# Patient Record
Sex: MALE | Race: WHITE | NOT HISPANIC OR LATINO | Employment: OTHER | URBAN - METROPOLITAN AREA
[De-identification: names, ages, dates, MRNs, and addresses within clinical notes are randomized per-mention and may not be internally consistent; named-entity substitution may affect disease eponyms.]

---

## 2017-04-10 ENCOUNTER — TELEPHONE (OUTPATIENT)
Dept: GASTROENTEROLOGY | Facility: CLINIC | Age: 52
End: 2017-04-10

## 2017-04-11 ENCOUNTER — TELEPHONE (OUTPATIENT)
Dept: GASTROENTEROLOGY | Facility: CLINIC | Age: 52
End: 2017-04-11

## 2017-04-11 DIAGNOSIS — G12.21 ALS (AMYOTROPHIC LATERAL SCLEROSIS): Primary | ICD-10-CM

## 2017-04-11 NOTE — TELEPHONE ENCOUNTER
Scheduled PEG tube removal for April 13. Lab will call wife with time. Patient will hold Eliquis until after procedure. Patient's wife verbalized understanding.

## 2017-04-11 NOTE — TELEPHONE ENCOUNTER
----- Message from Teodora Anderson sent at 4/10/2017  9:55 AM CDT -----  Contact: Anahy(wife)/[phone]  Patient's wife would like an appointment scheduled for the patient' feeding tube to be replaced.  Please advise

## 2017-04-11 NOTE — TELEPHONE ENCOUNTER
----- Message from Ella Horton sent at 4/11/2017 12:00 PM CDT -----  Contact: [phone]/self  Patient is returning your call. Please advise

## 2017-04-11 NOTE — TELEPHONE ENCOUNTER
Please inquire as to WHY the tube needs replacement.    Would need to be set up as an EGD slot to allow reinsertion of the PEG replacement. NO scope, only sedation.    CNB    ----- Message from Kathia Chavez LPN sent at 4/10/2017  1:33 PM CDT -----  Contact: Anahy(wife)/[phone]   Is this something you would like to do during a clinic appointment?  ----- Message -----     From: Teodora Anderson     Sent: 4/10/2017   9:55 AM       To: Tavon Hayes Staff ([place])    Patient's wife would like an appointment scheduled for the patient' feeding tube to be replaced.  Please advise

## 2017-04-17 ENCOUNTER — HOSPITAL ENCOUNTER (OUTPATIENT)
Facility: HOSPITAL | Age: 52
Discharge: HOME | End: 2017-04-17
Attending: INTERNAL MEDICINE | Admitting: INTERNAL MEDICINE
Payer: MEDICARE

## 2017-04-17 ENCOUNTER — SURGERY (OUTPATIENT)
Age: 52
End: 2017-04-17

## 2017-04-17 ENCOUNTER — ANESTHESIA EVENT (OUTPATIENT)
Dept: ENDOSCOPY | Facility: HOSPITAL | Age: 52
End: 2017-04-17
Payer: MEDICARE

## 2017-04-17 ENCOUNTER — ANESTHESIA (OUTPATIENT)
Dept: ENDOSCOPY | Facility: HOSPITAL | Age: 52
End: 2017-04-17
Payer: MEDICARE

## 2017-04-17 DIAGNOSIS — G12.21 ALS (AMYOTROPHIC LATERAL SCLEROSIS): Primary | ICD-10-CM

## 2017-04-17 DIAGNOSIS — Z43.1 PEG (PERCUTANEOUS ENDOSCOPIC GASTROSTOMY) ADJUSTMENT/REPLACEMENT/REMOVAL: ICD-10-CM

## 2017-04-17 LAB — POCT GLUCOSE: 103 MG/DL (ref 70–110)

## 2017-04-17 PROCEDURE — 25000003 PHARM REV CODE 250: Performed by: INTERNAL MEDICINE

## 2017-04-17 PROCEDURE — 63600175 PHARM REV CODE 636 W HCPCS: Performed by: NURSE ANESTHETIST, CERTIFIED REGISTERED

## 2017-04-17 PROCEDURE — 37000009 HC ANESTHESIA EA ADD 15 MINS: Performed by: INTERNAL MEDICINE

## 2017-04-17 PROCEDURE — 37000008 HC ANESTHESIA 1ST 15 MINUTES: Performed by: INTERNAL MEDICINE

## 2017-04-17 PROCEDURE — 43760 *HC REPLACEMENT GASTROS TUBE: CPT | Performed by: INTERNAL MEDICINE

## 2017-04-17 DEVICE — LOW PROFILE REPLACEMENT GASTROSTOMY TUBE
Type: IMPLANTABLE DEVICE | Site: ABDOMEN | Status: FUNCTIONAL
Brand: ENDOVIVE LOW PROFILE REPLACEMENT BUTTON

## 2017-04-17 RX ORDER — PROPOFOL 10 MG/ML
VIAL (ML) INTRAVENOUS
Status: DISCONTINUED | OUTPATIENT
Start: 2017-04-17 | End: 2017-04-17

## 2017-04-17 RX ORDER — SODIUM CHLORIDE 9 MG/ML
INJECTION, SOLUTION INTRAVENOUS CONTINUOUS
Status: DISCONTINUED | OUTPATIENT
Start: 2017-04-17 | End: 2017-04-17 | Stop reason: HOSPADM

## 2017-04-17 RX ORDER — PROPOFOL 10 MG/ML
VIAL (ML) INTRAVENOUS CONTINUOUS PRN
Status: DISCONTINUED | OUTPATIENT
Start: 2017-04-17 | End: 2017-04-17

## 2017-04-17 RX ADMIN — PROPOFOL 50 MG: 10 INJECTION, EMULSION INTRAVENOUS at 01:04

## 2017-04-17 RX ADMIN — SODIUM CHLORIDE: 0.9 INJECTION, SOLUTION INTRAVENOUS at 12:04

## 2017-04-17 RX ADMIN — PROPOFOL 150 MCG/KG/MIN: 10 INJECTION, EMULSION INTRAVENOUS at 01:04

## 2017-04-17 NOTE — ANESTHESIA PREPROCEDURE EVALUATION
04/17/2017  Bro Stafford is a 52 y.o., male with ALS, here for PEG tube adjustment.    Pt has been vent dependent for 6 years. No recent fever/cough/cold, no recent vent setting changes. Pt had nephrostomy tube placed under propfol sedation in February without complication.     Anesthesia Evaluation      I have reviewed the Medications.     Review of Systems  Anesthesia Hx:  No problems with previous Anesthesia Denies Hx of Anesthetic complications History of prior surgery of interest to airway management or planning: Previous anesthesia: General Denies Family Hx of Anesthesia complications.   Denies Personal Hx of Anesthesia complications.   Social:  Non-Smoker, No Alcohol Use    Hematology/Oncology:  Hematology Normal   Oncology Normal     EENT/Dental:EENT/Dental Normal   Cardiovascular:  Cardiovascular Normal Exercise tolerance: good     Pulmonary:  Pulmonary Normal    Renal/:  Renal/ Normal     Hepatic/GI:  Hepatic/GI Normal    Musculoskeletal:  Musculoskeletal Normal  Musculoskeletal General/Symptoms:  Muscle Disorders:    Neurological:  Dementia  Neuromuscular Disease, Amyotrophic Lateral Sclerosis Pt is vent dependant.  Endocrine:   Diabetes, well controlled, type 2    Dermatological:  Skin Normal    Psych:   anxiety          Physical Exam  General:  Well nourished    Airway/Jaw/Neck:  Airway Findings: Mouth Opening: Normal Tongue: Normal  Pre-Existing Airway Tube(s): Tracheostomy tube  Size: 8.0  General Airway Assessment: Adult  Mallampati: II      Dental:  Dental Findings: In tact   Chest/Lungs:  Chest/Lungs Findings: Clear to auscultation, Normal Respiratory Rate     Heart/Vascular:  Heart Findings: Rate: Normal  Rhythm: Regular Rhythm        Mental Status:  Mental Status Findings:  Cooperative, Alert and Oriented         Anesthesia Plan  Type of Anesthesia, risks & benefits  discussed:  Anesthesia Type:  MAC  Patient's Preference: MAC  Intra-op Monitoring Plan:   Intra-op Monitoring Plan Comments:   Post Op Pain Control Plan:   Post Op Pain Control Plan Comments:   Induction:   IV  Beta Blocker:         Informed Consent: Patient representative understands risks and agrees with Anesthesia plan.  Questions answered. Anesthesia consent signed with patient representative.  ASA Score: 4     Day of Surgery Review of History & Physical:            Ready For Surgery From Anesthesia Perspective.

## 2017-04-17 NOTE — TRANSFER OF CARE
"Anesthesia Transfer of Care Note    Patient: Bro Stafford    Procedure(s) Performed: Procedure(s) (LRB):  ESOPHAGOGASTRODUODENOSCOPY (EGD) (N/A)    Patient location: GI    Anesthesia Type: general    Transport from OR: Upon arrival to PACU/ICU, patient attached to ventilator and auscultated to confirm bilateral breath sounds and adequate TV    Post pain: adequate analgesia    Post assessment: no apparent anesthetic complications and tolerated procedure well    Post vital signs: stable    Level of consciousness: awake and alert    Nausea/Vomiting: no nausea/vomiting    Complications: none    Comments: Patient on home vent      Last vitals:   Visit Vitals    /63    Pulse 70    Temp 36.5 °C (97.7 °F)    Resp 18    Ht 5' 7" (1.702 m)    Wt 77.1 kg (170 lb)    SpO2 100%    BMI 26.63 kg/m2     "

## 2017-04-17 NOTE — DISCHARGE INSTRUCTIONS
Gastrostomy Feeding Tube Care: Flushing  With gastrostomy tube feeding, you need to keep the tube from getting clogged by flushing it with warm water after each feeding and before and after giving any medicines.             For continuous feeding  Flush the feeding tube with warm water and a clean syringe before the first daily feeding, after the last daily feeding, and other times as instructed. Follow the steps below:  1. Fill a clean bowl with warm water.  2. Put the tip of the syringe in the water.  3. Draw up 50 cc of water.  4. Turn off the pump.  5. Close the clamp on the feeding bag tubing.  6. Remove the tubing from the port.  7. Put the tip of the syringe in the feeding port.  8. Push the plunger down.  9. Let the water run through the feeding tube.  10. Start the feeding or close the cap on the feeding port.  11. Tape the tube to the skin with medical tape.         For bolus feeding  You may be told to flush the feeding tube before and after each feeding, or just after feedings. Use a clean syringe and warm water. Follow the steps below:  1. Fill a clean bowl with warm water.  2. Put the tip of the syringe in the water.  3. Draw up 50 cc of water (tap water is OK to use).   4. Open the cap on the feeding port.  5. Put the tip of the syringe in the feeding port.  6. Push down on the plunger. Let the water run through the tube.  7. Close the cap.  8. Tape the tube to the skin with medical tape.  Date Last Reviewed: 6/1/2016  © 9869-7505 [employer]. [address] [place] [address], [place] [address]. All rights reserved. This information is not intended as a substitute for professional medical care. Always follow your healthcare professional's instructions.        Understanding PEG Tube Feeding    Healthcare providers use PEG tube feeding (also called percutaneous endoscopic gastrostomy) when you cant swallow food safely or there is a blockage in your esophagus or stomach. Healthcare providers also  use the tube if you cant take enough food by mouth. The feeding tube lets food bypass the mouth and esophagus and go directly into your stomach or small intestine.  The path of food  When you take food by mouth, you chew your food into small pieces and swallow. The food moves down your esophagus into your stomach. From there, it goes into your small intestine and then into your large intestine. Solid waste (stool) is stored in your rectum and passed out through the anus.  How a PEG feeding tube is placed  Your healthcare provider places PEG tubes with the aid of a special instrument called an endoscope. This is a long, flexible, lighted tube that allows your healthcare provider to see inside your stomach. Your healthcare provider passes the endoscope through your mouth into your stomach. Your healthcare provider will also make a small surgical cut through your skin and into your stomach. He or she inserts the PEG tube through the opening while watching through the endoscope. A special balloon or cap holds the PEG tube in place inside your stomach. Your healthcare provider places a small dressing at the new opening.  Digestion works the same  Digestion works the same with a feeding tube as it does when you take food by mouth. So you get the same nutrition by tube feeding as you would get by mouth. If you have any questions or concerns about the feeding tube or its care, be certain to ask your provider. If you want a partner or significant other educated along with you about the feeding tube, let your provider or medical team know.   Date Last Reviewed: 7/1/2016 © 2000-2016 [employer]. [address] [place] [address], [place] [address]. All rights reserved. This information is not intended as a substitute for professional medical care. Always follow your healthcare professional's instructions.

## 2017-04-17 NOTE — ANESTHESIA POSTPROCEDURE EVALUATION
"Anesthesia Post Evaluation    Patient: Bro Stafford    Procedure(s) Performed: Procedure(s) (LRB):  ESOPHAGOGASTRODUODENOSCOPY (EGD) (N/A)    Final Anesthesia Type: general  Patient location during evaluation: GI PACU  Patient participation: No - Unable to Participate, Other Reason (see comments) (ALS)  Level of consciousness: awake and alert  Post-procedure vital signs: reviewed and stable  Pain management: adequate  Airway patency: patent  PONV status at discharge: No PONV  Anesthetic complications: no      Cardiovascular status: blood pressure returned to baseline and hemodynamically stable  Respiratory status: ventilator (home vent)  Hydration status: euvolemic  Follow-up not needed.        Visit Vitals    /63    Pulse 70    Temp 36.5 °C (97.7 °F)    Resp 18    Ht 5' 7" (1.702 m)    Wt 77.1 kg (170 lb)    SpO2 100%    BMI 26.63 kg/m2       Pain/Jann Score: Pain Assessment Performed: Yes (4/17/2017 12:08 PM)  Presence of Pain: denies (4/17/2017 12:08 PM)      "

## 2017-04-17 NOTE — H&P (VIEW-ONLY)
Short Stay Endoscopy History and Physical  [facility] [place] [facility] [place]    PCP: Russ Her MD   Referring MD: Same    Procedure - PEG exchange  ASA - per anesthesia  History of Anesthesia problems - no  Family history Anesthesia problems -  no   Plan of anesthesia - MAC    HPI:  This is a 52 y.o. male here for evaluation of : a PEG button (3.4cm x24Fr) placed approximately 1 year ago that has broken down and needs replacement. NO particular problems w feedings. Patient looks well, but is obviously weaker than past visit due to ALS progression.     ROS:  Constitutional: No fevers, chills, No weight loss  CV: No chest pain  Pulm: NO cough, No shortness of breath, patient vent dependent  Ophtho: No vision changes  GI: see HPI  Derm: No rash    Medical History:  has a past medical history of ALS (amyotrophic lateral sclerosis); Diabetes mellitus; Hearing decreased; Hypertension; and Ventilator dependent.    Surgical History:  has a past surgical history that includes Tracheostomy tube placement; Knee arthroscopy (Left); Esophagogastroduodenoscopy w/ PEG; and Nephrostomy.    Family History: family history is not on file.. Otherwise no colon cancer, inflammatory bowel disease, or GI malignancies.    Social History:  reports that he has quit smoking. He quit smokeless tobacco use about 5 years ago. He reports that he drinks alcohol. He reports that he does not use illicit drugs.    Review of patient's allergies indicates:  No Known Allergies    Medications:   Prescriptions Prior to Admission   Medication Sig Dispense Refill Last Dose    amitriptyline (ELAVIL) 50 MG tablet 50 mg by FEEDING TUBE route every evening.   4/12/2017 at 2100    apixaban (ELIQUIS) 5 mg Tab Take 5 mg by mouth 2 (two) times daily.   4/10/2017 at 2100    ascorbic acid (VITAMIN C) 1000 MG tablet Take 1,000 mg by mouth 2 (two) times daily.   4/12/2017 at 2100    benazepril (LOTENSIN) 10 MG tablet Take 10 mg by mouth.  1 4/12/2017 at 2100     dextromethorphan-quinidine (NUEDEXTA) 20-10 mg Cap Take 20 capsules by mouth once daily.   4/12/2017 at 0800    fluocinonide 0.05% (LIDEX) 0.05 % cream Apply 0.05 application topically 2 (two) times daily as needed.  0 4/12/2017 at 2100    fluticasone (FLONASE) 50 mcg/actuation nasal spray 50 sprays by Each Nare route 2 (two) times daily as needed.  5 4/12/2017 at 2100    folic acid (FOLVITE) 800 MCG Tab Take 800 mcg by mouth 2 (two) times daily.   4/12/2017 at 2100    glycopyrrolate (ROBINUL) 1 mg Tab Take 1 mg by mouth 3 (three) times daily.   4/12/2017 at 1400    levalbuterol (XOPENEX) 1.25 mg/0.5 mL nebulizer solution Take 1 ampule by nebulization every 4 (four) hours as needed for Wheezing. Rescue   Past Month at Unknown time    levocetirizine (XYZAL) 5 MG tablet 5 mg by Per G Tube route as needed.  4 4/12/2017 at 2100    melatonin 3 mg TbDL Take 3 mg by mouth every evening.   4/12/2017 at 2100    metformin (GLUCOPHAGE) 500 MG tablet 500 mg by Per G Tube route 2 (two) times daily.  3 4/12/2017 at 2100    omega-3 acid ethyl esters (LOVAZA) 1 gram capsule 1 g by Per G Tube route 2 (two) times daily.  5 4/12/2017 at 2100    vitamin E 1000 UNIT capsule Take 1,000 Units by mouth 2 (two) times daily.   4/12/2017 at 2100    atropine 1% (ISOPTO ATROPINE) 1 % Drop 5 drops by Other route 3 (three) times daily as needed.  4 More than a month at Unknown time    cyanocobalamin 1,000 mcg/mL injection Inject 1,000 mcg as directed as directed.   More than a month at Unknown time    NOVOLOG 100 unit/mL injection INJECT 8 UNITS UNDER THE SKIN QID  3 More than a month at Unknown time    scopolamine (TRANSDERM-SCOP) 1.5 mg (1 mg over 3 days) Place 1 patch onto the skin every 72 hours. 1/2 patch on left ear   More than a month at Unknown time    warfarin (COUMADIN) 3 MG tablet 4.5 mg by Per G Tube route every Tues, Thurs, Sat. And Sunday 5 5/7/2016 at 1700    warfarin (COUMADIN) 6 MG tablet 6 mg by Per G Tube route  every Mon, Wed, Fri.  3 5/7/2016 at 1700       Physical Exam:    Vital Signs:   Vitals:    04/13/17 1120   BP: 123/76   Pulse: 73   Resp: 18   Temp: 98.4 °F (36.9 °C)       General Appearance: Well appearing in no acute distress  Eyes:    No scleral icterus  ENT: Neck supple, Lips, mucosa, and tongue normal; teeth and gums normal  Lungs: CTA anteriorly  Heart:  Regular rate, S1, S2 normal, no murmurs heard.  Abdomen: Moderately obese, soft, non tender, non distended with normal bowel sounds. No hepatosplenomegaly, ascites, or mass.  -- PEG stoma is in excellent shape, no inflammatory tissue or erythema; PEG button is obviously degraded and the obturator cap is now NOT connected to the tube  Extremities: No edema  Skin: No rash    Labs:  No results found for: CBC / CMP    Plan:  Will proceed with planned PEG exchange. I have explained the risks and benefits of endoscopy procedures to the patient including but not limited to bleeding, perforation, infection, and death.        Freddy Montes De Oca MD, FACP, FACG, AGAF [facility]  [address], [address], [place] [address]  Phone: [phone] Fax: [phone] [address], [address], [place] [address]  Phone: [phone] Fax: [phone]

## 2017-04-17 NOTE — INTERVAL H&P NOTE
The patient has been examined and the H&P has been reviewed:    I concur with the findings and no changes have occurred since H&P was written.    Anesthesia/Surgery risks, benefits and alternative options discussed and understood by patient/family.          Active Hospital Problems    Diagnosis  POA    PEG (percutaneous endoscopic gastrostomy) adjustment/replacement/removal [Z43.1]  Not Applicable      Resolved Hospital Problems    Diagnosis Date Resolved POA   No resolved problems to display.

## 2017-04-18 VITALS
DIASTOLIC BLOOD PRESSURE: 80 MMHG | TEMPERATURE: 96 F | RESPIRATION RATE: 18 BRPM | BODY MASS INDEX: 26.68 KG/M2 | WEIGHT: 170 LBS | OXYGEN SATURATION: 100 % | HEIGHT: 67 IN | SYSTOLIC BLOOD PRESSURE: 123 MMHG | HEART RATE: 68 BPM

## 2017-04-26 NOTE — OP NOTE
"[facility] Short Procedure Note    Procedure: PEG tube exchange with sedation (17 April 2017)  Endoscopist: Tavon  Findings: (see also complete procedure note)  - Patient initially admitted on last Thursday for this procedure, however, hospital did not sure that appropriate sizes of PEG replacement tubes were available.  Patient readmitted today to allow PEG tube replacement after appropriate lengths of tubes were made available to us.    -After sedation with propofol, the patient was examined, showing a very healthy-appearing PEG tube fistula present, with a degraded low-profile "button" P G-tube in place.  --The existing PEG tube was deformed with a metal tool, and removed easily.  Small amount of bleeding occurred.  The new, same size (24 French by 3.4 cm, with one spacer) low-profile "button" non-balloon, PEG tube replacement was put in place without difficulty.    REC:   - Post procedure, the care and use of the low-profile "button" was discussed once again with the patient's spouse, who generally takes excellent care of the patient and his PEG tube.  -Follow-up as needed, or when the tube becomes degraded once again and needs replacement.    (Dictated 26 April 2017)  Freddy Montes De Oca MD, FACP, FACG, AGAF [facility]  [address], [address], [place] [address]  Phone: [phone] Fax: [phone]    [address], [address], [place] [address]  Phone: [phone] Fax: [phone]  "

## 2017-08-17 ENCOUNTER — TELEPHONE (OUTPATIENT)
Dept: OPHTHALMOLOGY | Facility: CLINIC | Age: 52
End: 2017-08-17

## 2018-08-02 ENCOUNTER — RX ONLY (OUTPATIENT)
Age: 53
Setting detail: RX ONLY
End: 2018-08-02

## 2018-08-02 RX ORDER — KETOCONAZOLE 20 MG/G
CREAM TOPICAL
Qty: 1 | Refills: 3 | Status: ERX | COMMUNITY
Start: 2018-08-02

## 2018-08-09 ENCOUNTER — APPOINTMENT (RX ONLY)
Dept: URBAN - METROPOLITAN AREA CLINIC 98 | Facility: CLINIC | Age: 53
Setting detail: DERMATOLOGY
End: 2018-08-09

## 2018-08-09 DIAGNOSIS — L82.1 OTHER SEBORRHEIC KERATOSIS: ICD-10-CM

## 2018-08-09 DIAGNOSIS — L71.8 OTHER ROSACEA: ICD-10-CM | Status: WELL CONTROLLED

## 2018-08-09 DIAGNOSIS — L30.9 DERMATITIS, UNSPECIFIED: ICD-10-CM

## 2018-08-09 DIAGNOSIS — L30.4 ERYTHEMA INTERTRIGO: ICD-10-CM

## 2018-08-09 PROBLEM — E13.9 OTHER SPECIFIED DIABETES MELLITUS WITHOUT COMPLICATIONS: Status: ACTIVE | Noted: 2018-08-09

## 2018-08-09 PROCEDURE — ? OBSERVATION

## 2018-08-09 PROCEDURE — 99213 OFFICE O/P EST LOW 20 MIN: CPT

## 2018-08-09 PROCEDURE — ? COUNSELING

## 2018-08-09 PROCEDURE — ? TREATMENT REGIMEN

## 2018-08-09 ASSESSMENT — LOCATION SIMPLE DESCRIPTION DERM
LOCATION SIMPLE: GROIN
LOCATION SIMPLE: ABDOMEN
LOCATION SIMPLE: RIGHT THIGH
LOCATION SIMPLE: LEFT THIGH
LOCATION SIMPLE: LEFT SCALP

## 2018-08-09 ASSESSMENT — LOCATION ZONE DERM
LOCATION ZONE: TRUNK
LOCATION ZONE: SCALP
LOCATION ZONE: LEG

## 2018-08-09 ASSESSMENT — LOCATION DETAILED DESCRIPTION DERM
LOCATION DETAILED: PERIUMBILICAL SKIN
LOCATION DETAILED: SUPRAPUBIC SKIN
LOCATION DETAILED: LEFT MEDIAL FRONTAL SCALP
LOCATION DETAILED: RIGHT ANTERIOR PROXIMAL THIGH
LOCATION DETAILED: LEFT ANTERIOR PROXIMAL THIGH

## 2018-08-09 ASSESSMENT — SEVERITY ASSESSMENT: SEVERITY: ALMOST CLEAR

## 2018-08-09 NOTE — PROCEDURE: TREATMENT REGIMEN
Initiate Treatment: try desonide cream or foam BID prn irritation
Samples Given: Desonide foam to stomach twice leonel
Discontinue Regimen: Hold of fluocinonide for now
Detail Level: Zone
Otc Regimen: Hibiclens wash from neck-down BIW
Continue Regimen: Ketoconozole 2% twice daily as needed for body fold rashes
Otc Regimen: Hibiclens wash BIW\\ntry Zeasorb AF powder for prophylaxis when rashes aren't active
Continue Regimen: Sulfacetamide cleanser BID prn\\nPermethrin cream overnight prn flare

## 2023-05-07 NOTE — HPI: RASH
How Severe Is Your Rash?: moderate
Is This A New Presentation, Or A Follow-Up?: Rash
Patient unable to complete

## 2024-01-15 NOTE — HPI: SECONDARY COMPLAINT
How Severe Is This Condition?: moderate 19464-Yenfzwgbuz OBS or IP - low complexity OR 25-34 mins 18049-Qkbftnsuai OBS or IP - low complexity OR 25-34 mins 44436-Xpepjskulv OBS or IP - low complexity OR 25-34 mins

## 2024-06-05 NOTE — IP AVS SNAPSHOT
[facility]  [address]  [place] [address]  Phone: [phone]           Patient Discharge Instructions   Our goal is to set you up for success. This packet includes information on your condition, medications, and your home care.  It will help you care for yourself to prevent having to return to the hospital.     Please ask your nurse if you have any questions.      There are many details to remember when preparing to leave the hospital. Here is what you will need to do:    1. Take your medicine. If you are prescribed medications, review your Medication List on the following pages. You may have new medications to  at the pharmacy and others that you'll need to stop taking. Review the instructions for how and when to take your medications. Talk with your doctor or nurses if you are unsure of what to do.     2. Go to your follow-up appointments. Specific follow-up information is listed in the following pages. Your may be contacted by a nurse or clinical provider about future appointments. Be sure we have all of the phone numbers to reach you. Please contact your provider's office if you are unable to make an appointment.     3. Watch for warning signs. Your doctor or nurse will give you detailed warning signs to watch for and when to call for assistance. These instructions may also include educational information about your condition. If you experience any of warning signs to your health, call your doctor.               ** Verify the list of medication(s) below is accurate and up to date. Carry this with you in case of emergency. If your medications have changed, please notify your healthcare provider.             Medication List      ASK your doctor about these medications        Additional Info                      amitriptyline 50 MG tablet   Commonly known as:  ELAVIL   Refills:  0   Dose:  50 mg    Instructions:  50 mg by FEEDING TUBE route every evening.     Begin Date    AM     The patient's goals for the shift include  Treat back pain  with pain medication.    The clinical goals for the shift include  Maintain safety.    Over the shift, the patient did make some progress toward the above listed  goals.      Noon    PM    Bedtime       atropine 1% 1 % Drop   Commonly known as:  ISOPTO ATROPINE   Refills:  4   Dose:  5 drop    Instructions:  5 drops by Other route 3 (three) times daily as needed.     Begin Date    AM    Noon    PM    Bedtime       benazepril 10 MG tablet   Commonly known as:  LOTENSIN   Refills:  1   Dose:  10 mg    Instructions:  Take 10 mg by mouth.     Begin Date    AM    Noon    PM    Bedtime       cyanocobalamin 1,000 mcg/mL injection   Refills:  0   Dose:  1000 mcg    Instructions:  Inject 1,000 mcg as directed as directed.     Begin Date    AM    Noon    PM    Bedtime       ELIQUIS 5 mg Tab   Refills:  0   Dose:  5 mg   Generic drug:  apixaban    Instructions:  Take 5 mg by mouth 2 (two) times daily.     Begin Date    AM    Noon    PM    Bedtime       fluocinonide 0.05% 0.05 % cream   Commonly known as:  LIDEX   Refills:  0   Dose:  0.05 application    Instructions:  Apply 0.05 application topically 2 (two) times daily as needed.     Begin Date    AM    Noon    PM    Bedtime       fluticasone 50 mcg/actuation nasal spray   Commonly known as:  FLONASE   Refills:  5   Dose:  50 spray    Instructions:  50 sprays by Each Nare route 2 (two) times daily as needed.     Begin Date    AM    Noon    PM    Bedtime       folic acid 800 MCG Tab   Commonly known as:  FOLVITE   Refills:  0   Dose:  800 mcg    Instructions:  Take 800 mcg by mouth 2 (two) times daily.     Begin Date    AM    Noon    PM    Bedtime       glycopyrrolate 1 mg Tab   Commonly known as:  ROBINUL   Refills:  0   Dose:  1 mg    Instructions:  Take 1 mg by mouth 3 (three) times daily.     Begin Date    AM    Noon    PM    Bedtime       levalbuterol 1.25 mg/0.5 mL nebulizer solution   Commonly known as:  XOPENEX   Refills:  0   Dose:  1 ampule    Instructions:  Take 1 ampule by nebulization every 4 (four) hours as needed for Wheezing. Rescue     Begin Date    AM    Noon    PM    Bedtime       levocetirizine 5 MG tablet   Commonly known as:   XYZAL   Refills:  4   Dose:  5 mg    Instructions:  5 mg by Per G Tube route as needed.     Begin Date    AM    Noon    PM    Bedtime       melatonin 3 mg Tbdl   Refills:  0   Dose:  3 mg    Instructions:  Take 3 mg by mouth every evening.     Begin Date    AM    Noon    PM    Bedtime       metformin 500 MG tablet   Commonly known as:  GLUCOPHAGE   Refills:  3   Dose:  500 mg    Instructions:  500 mg by Per G Tube route 2 (two) times daily.     Begin Date    AM    Noon    PM    Bedtime       NOVOLOG 100 unit/mL injection   Refills:  3   Generic drug:  insulin aspart    Instructions:  INJECT 8 UNITS UNDER THE SKIN QID     Begin Date    AM    Noon    PM    Bedtime       NUEDEXTA 20-10 mg per capsule   Refills:  0   Dose:  20 capsule   Generic drug:  dextromethorphan-quinidine 20-10 mg    Instructions:  Take 20 capsules by mouth once daily.     Begin Date    AM    Noon    PM    Bedtime       omega-3 acid ethyl esters 1 gram capsule   Commonly known as:  LOVAZA   Refills:  5   Dose:  1 g    Instructions:  1 g by Per G Tube route 2 (two) times daily.     Begin Date    AM    Noon    PM    Bedtime       scopolamine 1.5 mg (1 mg over 3 days)   Commonly known as:  TRANSDERM-SCOP   Refills:  0   Dose:  1 patch    Instructions:  Place 1 patch onto the skin every 72 hours. 1/2 patch on left ear     Begin Date    AM    Noon    PM    Bedtime       VITAMIN C 1000 MG tablet   Refills:  0   Dose:  1000 mg   Generic drug:  ascorbic acid (vitamin C)    Instructions:  Take 1,000 mg by mouth 2 (two) times daily.     Begin Date    AM    Noon    PM    Bedtime       vitamin E 1000 UNIT capsule   Refills:  0   Dose:  1000 Units    Instructions:  Take 1,000 Units by mouth 2 (two) times daily.     Begin Date    AM    Noon    PM    Bedtime       * warfarin 3 MG tablet   Commonly known as:  COUMADIN   Refills:  5   Dose:  4.5 mg    Instructions:  4.5 mg by Per G Tube route every Tues, Thurs, Sat. And Sunday     Begin Date    AM    Noon    PM     Bedtime       * warfarin 6 MG tablet   Commonly known as:  COUMADIN   Refills:  3   Dose:  6 mg    Instructions:  6 mg by Per G Tube route every Mon, Wed, Fri.     Begin Date    AM    Noon    PM    Bedtime       * Notice:  This list has 2 medication(s) that are the same as other medications prescribed for you. Read the directions carefully, and ask your doctor or other care provider to review them with you.               Please bring to all follow up appointments:    1. A copy of your discharge instructions.  2. All medicines you are currently taking in their original bottles.  3. Identification and insurance card.    Please arrive 15 minutes ahead of scheduled appointment time.    Please call 24 hours in advance if you must reschedule your appointment and/or time.        Your Future Surgeries/Procedures     Apr 17, 2017   Surgery with Freddy Montes De Oca MD   [facility] ([facility])    [address] [place] [id number]   [phone]                  Discharge Instructions         Gastrostomy Feeding Tube Care: Flushing  With gastrostomy tube feeding, you need to keep the tube from getting clogged by flushing it with warm water after each feeding and before and after giving any medicines.             For continuous feeding  Flush the feeding tube with warm water and a clean syringe before the first daily feeding, after the last daily feeding, and other times as instructed. Follow the steps below:  1. Fill a clean bowl with warm water.  2. Put the tip of the syringe in the water.  3. Draw up 50 cc of water.  4. Turn off the pump.  5. Close the clamp on the feeding bag tubing.  6. Remove the tubing from the port.  7. Put the tip of the syringe in the feeding port.  8. Push the plunger down.  9. Let the water run through the feeding tube.  10. Start the feeding or close the cap on the feeding port.  11. Tape the tube to the skin with medical tape.         For bolus  feeding  You may be told to flush the feeding tube before and after each feeding, or just after feedings. Use a clean syringe and warm water. Follow the steps below:  1. Fill a clean bowl with warm water.  2. Put the tip of the syringe in the water.  3. Draw up 50 cc of water (tap water is OK to use).   4. Open the cap on the feeding port.  5. Put the tip of the syringe in the feeding port.  6. Push down on the plunger. Let the water run through the tube.  7. Close the cap.  8. Tape the tube to the skin with medical tape.  Date Last Reviewed: 6/1/2016  © 4260-6855 [employer]. [address] [place] [address], [place] [address]. All rights reserved. This information is not intended as a substitute for professional medical care. Always follow your healthcare professional's instructions.        Understanding PEG Tube Feeding    Healthcare providers use PEG tube feeding (also called percutaneous endoscopic gastrostomy) when you cant swallow food safely or there is a blockage in your esophagus or stomach. Healthcare providers also use the tube if you cant take enough food by mouth. The feeding tube lets food bypass the mouth and esophagus and go directly into your stomach or small intestine.  The path of food  When you take food by mouth, you chew your food into small pieces and swallow. The food moves down your esophagus into your stomach. From there, it goes into your small intestine and then into your large intestine. Solid waste (stool) is stored in your rectum and passed out through the anus.  How a PEG feeding tube is placed  Your healthcare provider places PEG tubes with the aid of a special instrument called an endoscope. This is a long, flexible, lighted tube that allows your healthcare provider to see inside your stomach. Your healthcare provider passes the endoscope through your mouth into your stomach. Your healthcare provider will also make a small surgical cut through your skin and into your stomach.  "He or she inserts the PEG tube through the opening while watching through the endoscope. A special balloon or cap holds the PEG tube in place inside your stomach. Your healthcare provider places a small dressing at the new opening.  Digestion works the same  Digestion works the same with a feeding tube as it does when you take food by mouth. So you get the same nutrition by tube feeding as you would get by mouth. If you have any questions or concerns about the feeding tube or its care, be certain to ask your provider. If you want a partner or significant other educated along with you about the feeding tube, let your provider or medical team know.   Date Last Reviewed: 7/1/2016 © 2000-2016 [employer]. [address] [place] [address], [place] [address]. All rights reserved. This information is not intended as a substitute for professional medical care. Always follow your healthcare professional's instructions.            Admission Information     Date & Time Provider Department CSN    4/17/2017 11:28 AM Freddy Montes De Oca MD [facility] [id number]      Care Providers     Provider Role Specialty Primary office phone    Freddy Montes De Oca MD Attending Provider Gastroenterology [phone]    PHYSICIAN, COLONOSCOPY Surgeon  Endoscopy Number not on file    Freddy Montes De Oca MD Surgeon  Gastroenterology [phone]      Your Vitals Were     BP Pulse Temp Resp Height Weight    123/80 68 96.4 °F (35.8 °C) (Axillary) 18 5' 7" (1.702 m) 77.1 kg (170 lb)    SpO2 BMI             100% 26.63 kg/m2         Recent Lab Values     No lab values to display.      Allergies as of 4/17/2017     Not on File      [facility]     [facility] Nurse Care Line - 24/7 Assistance  Unless otherwise directed by your provider, please contact [facility], our nurse care line that is available for 24/7 assistance.     Registered nurses in the [facility] provide clinical " advisement, health education, appointment booking, and other advisory services.  Call for this free service at [phone].        Advance Directives     An advance directive is a document which, in the event you are no longer able to make decisions for yourself, tells your healthcare team what kind of treatment you do or do not want to receive, or who you would like to make those decisions for you.  If you do not currently have an advance directive, [facility] encourages you to create one.  For more information call:  [phone]), [phone] ([phone]),  or log on to [email or web address].        Smoking Cessation     If you would like to quit smoking:   You may be eligible for free services if you are a [place] resident and started smoking cigarettes before September 1, 1988.  Call the Smoking Cessation [employer] ([facility]) toll free at [phone] or [phone].   Call 4-284-QUIT-NOW if you do not meet the above criteria.   Contact us via email: [email or web address]   View our website for more information: [email or web address]        Language Assistance Services     ATTENTION: Language assistance services are available, free of charge. Please call [phone].      ATENCIÓN: Si habla español, tiene a pierre disposición servicios gratuitos de asistencia lingüística. Llame al [phone].     CHÚ Ý: N?u b?n nói Ti?ng Vi?t, có các d?ch v? h? tr? ngôn ng? mi?n phí dành cho b?n. G?i s? [phone].        Coumadin Discharge Instructions                         Eliquis Informaiton           [facility] Sign-Up     Activating your [facility] account is as easy as 1-2-3!     1) Visit [email or web address], select Sign Up Now, enter this activation code and your date of birth, then select Next.  [email or web address]  Expires: 6/1/2017  2:34 PM      2) Create a username and password to use when you visit [facility] in the future and select a security question in case you lose your password and  select Next.    3) Enter your e-mail address and click Sign Up!    Additional Information  If you have questions, please e-mail [email or web address] or call [phone] to talk to our [facility] staff. Remember, [facility] is NOT to be used for urgent needs. For medical emergencies, dial 911.          [facility] complies with applicable Federal civil rights laws and does not discriminate on the basis of race, color, national origin, age, disability, or sex.